# Patient Record
(demographics unavailable — no encounter records)

---

## 2024-12-05 NOTE — HISTORY OF PRESENT ILLNESS
[FreeTextEntry1] : Language: English Accompanied by: Self Contact info: [phone] Referring Provider/PCP: Ana Chan   Initial H&P 12/05/2024: Mr. WEISS is a very pleasant 46 year-old gentleman who presents today for establishment of care.   2y ago, had annual checkup, and he was told that he has an enlarged prostate. Saw a urologist back then, who ended up moving, and he would now like to establish care. Was told everything was normal and no additional treatment was needed.   Currently denies LUTS.   Also c/o ED.  Rates firmness 4/4 at onset.  After ejaculation, noticed that latency period is longer than it used to be, and if he tries to have sex again, he will occasionally lose his erection in the middle of sex. Not sure if it is stress related.  Currently in the middle of a divorce.   Also c/o premature ejaculation - started after watching more pornography recently.  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 --------------------------------------------------------------------------------------------------------------------------------------- Physical Exam: General: NAD, sitting on exam table comfortably HEENT: NCAT, EOMI Resp: breathing comfortably on RA, b/l symm chest rise Cardiac: RRR Abd: SNTND Back: (-) CVAT b/l MSK: ALEXIA varghese/ FROM Psych: appropriate affect --------------------------------------------------------------------------------------------------------------------------------------- Results: PSA 08/23/24: 1.7  US Bladder 08/22/22 ([facility]): pros vol 24.1cc, PVR 37cc  --------------------------------------------------------------------------------------------------------------------------------------- A/P: 46M with ED and premature ejaculation  1. ED I had an extensive discussion with the patient regarding his erectile dysfunction. His ED is a source of significant frustration for him and his partner and I am actively interested in making this condition better for him.  We reviewed the various treatment modalities for erectile dysfunction and as it relates to him, we discussed the treatments he has tried as well as general goals of treatments for erectile dysfunction in general. In the end, we reviewed erectile dysfunction treatment consisting of phosphodiesterase inhibitors for those that are without contraindication, vacuum devices, intraurethral alprostadil also known as MUSE, penile injection therapy and the penile implant. He is interested in PDE5s.  We had a thorough discussion today in the office regarding PDE5 inhibitors as well as their potential side effects and success rates. We specifically talked about rates of headache, indigestion/dyspepsia, back pain/vision changes. All questions were answered.  He does have relatively healthy erections at baseline, considering that he is able to achieve a firm erection at the onset, which lasts to completion, and his main issue is longer latency time and losing the erection in the middle of sexual activity when he is having a second round of intercourse.  Based on his description of his current situation with his relationship as well as his current divorce, there is some aspect of anxiety and stress affecting his erections.  It is fairly normal to have a longer latency time at his age, particularly if there is decreased desire on his end.  I offered him sildenafil, nonetheless, as it is becoming very stressful for him in his current relationship.  He was interested.  2. Premature Ejaculation I had an extensive discussion with the patient regarding his erectile dysfunction and premature ejaculation.   We did discuss more conventional methods for premature ejaculation management including barrier methods (condoms), the squeeze technique as well as start stop techniques. We had a thorough discussion today in the office regarding the pharmacologic therapy for PE - which includes initiation of on demand SSRI therapy.  I have had particular success with Paroxetine (paxil) 20 mg with then subsequent titration up or down based on treatment symptoms.  He would prefer to try the behavioral mods and topical therapy prior to proceeding with SSRI.   I have answered all of his questions today, and I will see him in 3 months or sooner PRN.  Plan: - start sildenafil 50mg PRN ([facility]) - behavioral mods as above - RTC 3 months or sooner PRN  I spent a total of 53 minutes on face-to-face counseling, coordination of care, review of prior records and clinical documentation.

## 2024-12-05 NOTE — HISTORY OF PRESENT ILLNESS
[FreeTextEntry1] : Language: English Accompanied by: Self Contact info: [phone] Referring Provider/PCP: Ana Chan   Initial H&P 12/05/2024: Mr. WEISS is a very pleasant 46 year-old gentleman who presents today for establishment of care.   2y ago, had annual checkup, and he was told that he has an enlarged prostate. Saw a urologist back then, who ended up moving, and he would now like to establish care. Was told everything was normal and no additional treatment was needed.   Currently denies LUTS.   Also c/o ED.  Rates firmness 4/4 at onset.  After ejaculation, noticed that latency period is longer than it used to be, and if he tries to have sex again, he will occasionally lose his erection in the middle of sex. Not sure if it is stress related.  Currently in the middle of a divorce.   Also c/o premature ejaculation - started after watching more pornography recently.  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 --------------------------------------------------------------------------------------------------------------------------------------- Physical Exam: General: NAD, sitting on exam table comfortably HEENT: NCAT, EOMI Resp: breathing comfortably on RA, b/l symm chest rise Cardiac: RRR Abd: SNTND Back: (-) CVAT b/l MSK: ALEXIA varghese/ FROM Psych: appropriate affect --------------------------------------------------------------------------------------------------------------------------------------- Results: PSA 08/23/24: 1.7  US Bladder 08/22/22 ([facility]): pros vol 24.1cc, PVR 37cc  --------------------------------------------------------------------------------------------------------------------------------------- A/P: 46M with ED and premature ejaculation  1. ED I had an extensive discussion with the patient regarding his erectile dysfunction. His ED is a source of significant frustration for him and his partner and I am actively interested in making this condition better for him.  We reviewed the various treatment modalities for erectile dysfunction and as it relates to him, we discussed the treatments he has tried as well as general goals of treatments for erectile dysfunction in general. In the end, we reviewed erectile dysfunction treatment consisting of phosphodiesterase inhibitors for those that are without contraindication, vacuum devices, intraurethral alprostadil also known as MUSE, penile injection therapy and the penile implant. He is interested in PDE5s.  We had a thorough discussion today in the office regarding PDE5 inhibitors as well as their potential side effects and success rates. We specifically talked about rates of headache, indigestion/dyspepsia, back pain/vision changes. All questions were answered.  He does have relatively healthy erections at baseline, considering that he is able to achieve a firm erection at the onset, which lasts to completion, and his main issue is longer latency time and losing the erection in the middle of sexual activity when he is having a second round of intercourse.  Based on his description of his current situation with his relationship as well as his current divorce, there is some aspect of anxiety and stress affecting his erections.  It is fairly normal to have a longer latency time at his age, particularly if there is decreased desire on his end.  I offered him sildenafil, nonetheless, as it is becoming very stressful for him in his current relationship.  He was interested.  2. Premature Ejaculation I had an extensive discussion with the patient regarding his erectile dysfunction and premature ejaculation.   We did discuss more conventional methods for premature ejaculation management including barrier methods (condoms), the squeeze technique as well as start stop techniques. We had a thorough discussion today in the office regarding the pharmacologic therapy for PE - which includes initiation of on demand SSRI therapy.  I have had particular success with Paroxetine (paxil) 20 mg with then subsequent titration up or down based on treatment symptoms.  He would prefer to try the behavioral mods and topical therapy prior to proceeding with SSRI.   I have answered all of his questions today, and I will see him in 3 months or sooner PRN.  Plan: - start sildenafil 50mg PRN ([facility]) - behavioral mods as above - RTC 3 months or sooner PRN  I spent a total of 53 minutes on face-to-face counseling, coordination of care, review of prior records and clinical documentation.